# Patient Record
Sex: MALE | Race: WHITE | NOT HISPANIC OR LATINO | URBAN - METROPOLITAN AREA
[De-identification: names, ages, dates, MRNs, and addresses within clinical notes are randomized per-mention and may not be internally consistent; named-entity substitution may affect disease eponyms.]

---

## 2020-07-06 ENCOUNTER — WEB ENCOUNTER (OUTPATIENT)
Dept: URBAN - METROPOLITAN AREA CLINIC 35 | Facility: CLINIC | Age: 25
End: 2020-07-06

## 2020-07-07 ENCOUNTER — TELEPHONE ENCOUNTER (OUTPATIENT)
Dept: URBAN - METROPOLITAN AREA CLINIC 35 | Facility: CLINIC | Age: 25
End: 2020-07-07

## 2020-07-07 ENCOUNTER — OFFICE VISIT (OUTPATIENT)
Dept: URBAN - METROPOLITAN AREA CLINIC 35 | Facility: CLINIC | Age: 25
End: 2020-07-07

## 2020-07-07 VITALS — WEIGHT: 165 LBS | BODY MASS INDEX: 24.44 KG/M2 | HEIGHT: 69 IN

## 2020-07-07 PROBLEM — 274774002 ELEVATED LEVELS OF TRANSAMINASE & LACTIC ACID DEHYDROGENASE: Status: ACTIVE | Noted: 2020-07-07

## 2020-07-07 PROBLEM — 301715003: Status: ACTIVE | Noted: 2020-07-07

## 2020-07-07 RX ORDER — HYOSCYAMINE SULFATE 0.12 MG/1
1 TABLET AS NEEDED TABLET ORAL
Qty: 28 TABLET | Refills: 1 | OUTPATIENT
Start: 2020-07-07

## 2020-07-07 RX ORDER — LORATADINE 10 MG/1
1 TABLET TABLET ORAL ONCE A DAY
Qty: 30 | Status: ACTIVE | COMMUNITY

## 2020-07-07 RX ORDER — NAPROXEN SODIUM 220 MG/1
1 TABLET WITH FOOD OR MILK AS NEEDED TABLET ORAL
Status: ACTIVE | COMMUNITY

## 2020-07-07 NOTE — HPI-MIGRATED HPI
;   ;     Hepatitis C : Patient presents today via telephone with consent at the request of PCP Denis Choi for consultation about Hepatitis C.  Patient was first diagnosed via routine labs with PCP on Oct. 8, 2019 noting HCV RNA Quant Real Time PCR (High) 69647, HCV RNA Quant Real Time PCR (High) 4.75.    Patient denies previous treatment.    Patient currently denies jaundice, chills, RUQ pains, easy bruising.  Admit fatigue and intermittent episodes of dizziness over the past several months..     RISK FACTORS:   Admit Alcohol use with drinking 1 glass of a mixed alcoholic beverage on Fridays, Admit marijuana drug use years ago, travel outside the  to [facility] (5/2019), NSAID use with taking Aleve 2 QD 1-2 times a month for Migraine-type headaches, tattoos completed during incarceration and 2 in a  basement (not professionally done), in nursing home for 18 months.   Denies protein supplements, piercing's,  service, blood transfusion, family history of liver disease or cancer, personal exposure to liver diseases, rehab    Outside labs:PCP (10/8/2019) Hep A AB, Total (Non-Reactive), Hep B Surface Antibody QL (Non-Reactive), Hep B Surface Antigen (Non-Reactive), Hep B Core AB, Total (Non-Reactive), Hep C Antibody (Reactive Abnormal),  CMP: AST (High) 53, ALT (High) 85, Alk Phos (WNL) 84, Bilirubin Total (WNL) 0.9, Albumin (WNL) 4.7   CBC: WBC (WNL) 5.4, Hgb (WNL) 16.5, Hct (WNL) 47.0, Platelet (WNL) 316;   Abdominal Pain : Admit abdominal pain located in the mid abdomen.  Onset 1 day ago.  Described as a sharp stabbing then will have a burning sensation that would last for minutes to hours then gradually improved.  Symptoms begin upon waking up yesterday and today and persist intermittently throughout the day.  He took Aleve with some relief.He has no symtoms currently  Denies having imaging or ER visit for symptoms. ;

## 2020-08-26 ENCOUNTER — TELEPHONE ENCOUNTER (OUTPATIENT)
Dept: URBAN - METROPOLITAN AREA CLINIC 35 | Facility: CLINIC | Age: 25
End: 2020-08-26

## 2020-08-27 ENCOUNTER — OFFICE VISIT (OUTPATIENT)
Dept: URBAN - METROPOLITAN AREA CLINIC 33 | Facility: CLINIC | Age: 25
End: 2020-08-27

## 2020-08-27 RX ORDER — NAPROXEN SODIUM 220 MG/1
1 TABLET WITH FOOD OR MILK AS NEEDED TABLET ORAL
Status: ACTIVE | COMMUNITY

## 2020-08-27 RX ORDER — LORATADINE 10 MG/1
1 TABLET TABLET ORAL ONCE A DAY
Qty: 30 | Status: ACTIVE | COMMUNITY

## 2020-08-27 RX ORDER — HYOSCYAMINE SULFATE 0.12 MG/1
1 TABLET AS NEEDED TABLET ORAL
Qty: 28 TABLET | Refills: 1 | Status: ACTIVE | COMMUNITY
Start: 2020-07-07

## 2020-08-27 NOTE — HPI-MIGRATED HPI
;   ;     Hepatitis C :  Patient presents today for follow up about Hepatitis C testing. Patient denies/admits previous treatment.  Patient currently ____  jaundice, chills, RUQ pains, dizziness, easy bruising, fatigue.   Alcohol, drugs, travel outside the [place], NSAIDs, protein supplements, tattoos, piercings,  service, blood transfusion, family history of liver disease or cancer, personal exposure to liver diseases, snf, rehab.  ?Start Tylenol Capsule, 325 MG, 1 capsule as needed, Orally, every 6 hrs. ?Start Hyoscyamine Sulfate Tablet, 0.125 MG, 1 tablet as needed, Orally, Four times a day.  ?Hep A and B vaccinations adviced       Last visit (07/07/2020) Patient presents today via telephone with consent at the request of PCP Denis Choi for consultation about Hepatitis C.  Patient was first diagnosed via routine labs with PCP on Oct. 8, 2019 noting HCV RNA Quant Real Time PCR (High) [id number], HCV RNA Quant Real Time PCR (High) 4.75.    Patient denies previous treatment.    Patient currently denies jaundice, chills, RUQ pains, easy bruising.  Admit fatigue and intermittent episodes of dizziness over the past several months..     RISK FACTORS:   Admit Alcohol use with drinking 1 glass of a mixed alcoholic beverage on Fridays, Admit marijuana drug use years ago, travel outside the  to [facility] (5/2019), NSAID use with taking Aleve 2 QD 1-2 times a month for Migraine-type headaches, tattoos completed during incarceration and 2 in a  basement (not professionally done), in snf for 18 months.   Denies protein supplements, piercing's,  service, blood transfusion, family history of liver disease or cancer, personal exposure to liver diseases, rehab    Outside labs:PCP (10/8/2019) Hep A AB, Total (Non-Reactive), Hep B Surface Antibody QL (Non-Reactive), Hep B Surface Antigen (Non-Reactive), Hep B Core AB, Total (Non-Reactive), Hep C Antibody (Reactive Abnormal),  CMP: AST (High) 53, ALT (High) 85, Alk Phos (WNL) 84, Bilirubin Total (WNL) 0.9, Albumin (WNL) 4.7   CBC: WBC (WNL) 5.4, Hgb (WNL) 16.5, Hct (WNL) 47.0, Platelet (WNL) 316;   Abdominal Pain :        Last visit (07/07/2020) Admit abdominal pain located in the mid abdomen.  Onset 1 day ago.  Described as a sharp stabbing then will have a burning sensation that would last for minutes to hours then gradually improved.  Symptoms begin upon waking up yesterday and today and persist intermittently throughout the day.  He took Aleve with some relief.He has no symtoms currently  Denies having imaging or ER visit for symptoms. ;

## 2020-11-03 ENCOUNTER — TELEPHONE ENCOUNTER (OUTPATIENT)
Dept: URBAN - METROPOLITAN AREA CLINIC 35 | Facility: CLINIC | Age: 25
End: 2020-11-03

## 2020-11-23 ENCOUNTER — TELEPHONE ENCOUNTER (OUTPATIENT)
Dept: URBAN - METROPOLITAN AREA CLINIC 35 | Facility: CLINIC | Age: 25
End: 2020-11-23

## 2020-11-24 ENCOUNTER — OFFICE VISIT (OUTPATIENT)
Dept: URBAN - METROPOLITAN AREA CLINIC 31 | Facility: CLINIC | Age: 25
End: 2020-11-24

## 2020-11-24 RX ORDER — HYOSCYAMINE SULFATE 0.12 MG/1
1 TABLET AS NEEDED TABLET ORAL
Qty: 28 TABLET | Refills: 1 | Status: ACTIVE | COMMUNITY
Start: 2020-07-07

## 2020-11-24 RX ORDER — NAPROXEN SODIUM 220 MG/1
1 TABLET WITH FOOD OR MILK AS NEEDED TABLET ORAL
Status: ACTIVE | COMMUNITY

## 2020-11-24 RX ORDER — LORATADINE 10 MG/1
1 TABLET TABLET ORAL ONCE A DAY
Qty: 30 | Status: ACTIVE | COMMUNITY

## 2020-11-24 NOTE — HPI-MIGRATED HPI
;   ;     Hepatitis C :  Patient presents today for follow up about Hepatitis C testing. Patient denies/admits previous treatment.  Patient currently ____  jaundice, chills, RUQ pains, dizziness, easy bruising, fatigue.   ? Avoid NSAIDs  Alcohol, drugs, travel outside the [place], NSAIDs, protein supplements, tattoos, piercings,  service, blood transfusion, family history of liver disease or cancer, personal exposure to liver diseases, correction, rehab.  ?Start Tylenol Capsule, 325 MG, 1 capsule as needed, Orally, every 6 hrs. ?Start Hyoscyamine Sulfate Tablet, 0.125 MG, 1 tablet as needed, Orally, Four times a day.  ?Hep A and B vaccinations adviced       Last visit (07/07/2020) Patient presents today via telephone with consent at the request of PCP Denis Choi for consultation about Hepatitis C.  Patient was first diagnosed via routine labs with PCP on Oct. 8, 2019 noting HCV RNA Quant Real Time PCR (High) [id number], HCV RNA Quant Real Time PCR (High) 4.75.    Patient denies previous treatment.    Patient currently denies jaundice, chills, RUQ pains, easy bruising.  Admit fatigue and intermittent episodes of dizziness over the past several months..     RISK FACTORS:   Admit Alcohol use with drinking 1 glass of a mixed alcoholic beverage on Fridays, Admit marijuana drug use years ago, travel outside the  to [facility]"" (5/2019), NSAID use with taking Aleve 2 QD 1-2 times a month for Migraine-type headaches, tattoos completed during incarceration and 2 in a  basement (not professionally done), in correction for 18 months.   Denies protein supplements, piercing's,  service, blood transfusion, family history of liver disease or cancer, personal exposure to liver diseases, rehab    Outside labs:PCP (10/8/2019) Hep A AB, Total (Non-Reactive), Hep B Surface Antibody QL (Non-Reactive), Hep B Surface Antigen (Non-Reactive), Hep B Core AB, Total (Non-Reactive), Hep C Antibody (Reactive Abnormal),  CMP: AST (High) 53, ALT (High) 85, Alk Phos (WNL) 84, Bilirubin Total (WNL) 0.9, Albumin (WNL) 4.7   CBC: WBC (WNL) 5.4, Hgb (WNL) 16.5, Hct (WNL) 47.0, Platelet (WNL) 316;   Abdominal Pain : Patient admits/denies continued episodes of abdominal pain since his last visit.   Last visit (07/07/2020) Admit abdominal pain located in the mid abdomen.  Onset 1 day ago.  Described as a sharp stabbing then will have a burning sensation that would last for minutes to hours then gradually improved.  Symptoms begin upon waking up yesterday and today and persist intermittently throughout the day.  He took Aleve with some relief.He has no symtoms currently  Denies having imaging or ER visit for symptoms. ;

## 2020-12-09 LAB
ABSOLUTE BASOPHILS: 51
ABSOLUTE EOSINOPHILS: 9
ABSOLUTE LYMPHOCYTES: 1831
ABSOLUTE MONOCYTES: 221
ABSOLUTE NEUTROPHILS: 2489
ALBUMIN/GLOBULIN RATIO: 1.7
ALBUMIN: 4.5
ALKALINE PHOSPHATASE: 70
ALT: 77
AST: 47
BASOPHILS: 1.1
BILIRUBIN, TOTAL: 0.9
BUN/CREATININE RATIO: (no result)
CALCIUM: 9.5
CARBON DIOXIDE: 27
CHLORIDE: 104
CREATININE: 0.86
EGFR AFRICAN AMERICAN: 140
EGFR NON-AFR. AMERICAN: 121
EOSINOPHILS: 0.2
GLOBULIN: 2.6
GLUCOSE: 121
HCV RNA, QUANTITATIVE: (no result)
HCV RNA, QUANTITATIVE: 6.15
HEMATOCRIT: 44.3
HEMOGLOBIN: 15.4
HEPATITIS C VIRAL RNA GENOTYPE, LIPA(R): 3
LIPASE: 11
LYMPHOCYTES: 39.8
MCH: 31.6
MCHC: 34.8
MCV: 91
MONOCYTES: 4.8
MPV: 10.6
NEUTROPHILS: 54.1
PLATELET COUNT: 259
POTASSIUM: 3.7
PROTEIN, TOTAL: 7.1
RDW: 11.9
RED BLOOD CELL COUNT: 4.87
SODIUM: 140
UREA NITROGEN (BUN): 8
WHITE BLOOD CELL COUNT: 4.6

## 2021-01-18 ENCOUNTER — OFFICE VISIT (OUTPATIENT)
Dept: URBAN - METROPOLITAN AREA CLINIC 33 | Facility: CLINIC | Age: 26
End: 2021-01-18

## 2021-01-18 VITALS
SYSTOLIC BLOOD PRESSURE: 132 MMHG | BODY MASS INDEX: 27.55 KG/M2 | OXYGEN SATURATION: 98 % | WEIGHT: 186 LBS | DIASTOLIC BLOOD PRESSURE: 86 MMHG | HEART RATE: 85 BPM | HEIGHT: 69 IN

## 2021-01-18 PROBLEM — 443503005: Status: ACTIVE | Noted: 2020-07-07

## 2021-01-18 RX ORDER — NAPROXEN SODIUM 220 MG/1
1 TABLET WITH FOOD OR MILK AS NEEDED TABLET ORAL
Status: ACTIVE | COMMUNITY

## 2021-01-18 RX ORDER — GLECAPREVIR AND PIBRENTASVIR 40; 100 MG/1; MG/1
3 TABLETS TABLET, FILM COATED ORAL ONCE A DAY
Qty: 168 TABLET | Refills: 0 | OUTPATIENT
Start: 2021-01-18

## 2021-01-18 RX ORDER — LORATADINE 10 MG/1
1 TABLET TABLET ORAL ONCE A DAY
Qty: 30 | Status: ACTIVE | COMMUNITY

## 2021-01-18 RX ORDER — HYOSCYAMINE SULFATE 0.12 MG/1
1 TABLET AS NEEDED TABLET ORAL
Qty: 28 TABLET | Refills: 1 | Status: DISCONTINUED | COMMUNITY
Start: 2020-07-07

## 2021-01-18 NOTE — HPI-MIGRATED HPI
;   ;   ;   ;     Bloating/Gas : Admits bloating and gas. Symptoms are described as abdominal distension and flatulence. He notes onset of symptoms occurred 12/2020, with episodes occurring once a week. He denies any alleviating or aggravating factors. He denies having tried any medications for relief of symptoms. ;   Hepatitis C : Patient presents today for follow up for Hepatitis C testing. Patient denies previous treatment. He denies having Hep A and B vaccinations since his last office visit.   He currently denies jaundice, chills, RUQ pains, dizziness, or easy bruising. He admits daily fatigue.   He continues to avoid NSAID use.    Risk Factors:  He admits 1 glass of whiskey every 2 weeks.  Denies drugs, travel outside the [place], NSAID's, protein supplements, tattoos, piercing's,  service, blood transfusion, family history of liver disease or cancer, personal exposure to liver diseases, half-way, rehab.        Last visit (07/07/2020) Patient presents today via telephone with consent at the request of PCP Denis Choi for consultation about Hepatitis C.  Patient was first diagnosed via routine labs with PCP on Oct. 8, 2019 noting HCV RNA Quant Real Time PCR (High) [id number], HCV RNA Quant Real Time PCR (High) 4.75.    Patient denies previous treatment.    Patient currently denies jaundice, chills, RUQ pains, easy bruising.  Admit fatigue and intermittent episodes of dizziness over the past several months..     RISK FACTORS:   Admit Alcohol use with drinking 1 glass of a mixed alcoholic beverage on Fridays, Admit marijuana drug use years ago, travel outside the  to [facility] (5/2019), NSAID use with taking Aleve 2 QD 1-2 times a month for Migraine-type headaches, tattoos completed during incarceration and 2 in a  basement (not professionally done), in half-way for 18 months.   Denies protein supplements, piercing's,  service, blood transfusion, family history of liver disease or cancer, personal exposure to liver diseases, rehab    Outside labs:PCP (10/8/2019) Hep A AB, Total (Non-Reactive), Hep B Surface Antibody QL (Non-Reactive), Hep B Surface Antigen (Non-Reactive), Hep B Core AB, Total (Non-Reactive), Hep C Antibody (Reactive Abnormal),  CMP: AST (High) 53, ALT (High) 85, Alk Phos (WNL) 84, Bilirubin Total (WNL) 0.9, Albumin (WNL) 4.7   CBC: WBC (WNL) 5.4, Hgb (WNL) 16.5, Hct (WNL) 47.0, Platelet (WNL) 316;   Change in Bowel habits : Patient reports a change in bowel habits since his last office visit. He admits onset of symptoms occurred over 5 years ago. Admits 1 bowel movement every 2 days, with strain. Stools vary in consistency from separate hard lumps, to normal and formed.   He denies any mucus, melena. He admits rectal bleeding, enough to stain the water bowl. The blood is bright red in color.   He reports his normal bowel habits were 1 bowel movement per day, without strain.  He denies any alleviating or aggravating factors. He denies having tried any medications for relief of symptoms. ;   Abdominal Pain : Patient denies continued episodes of abdominal pain since his last visit. He denies use of Tylenol Capsule, 325 MG, 1 capsule PRN, every 6 hrs.  He also denies starting Hyoscyamine Sulfate Tablet, 0.125 MG, 1 tablet PRN.        Last visit (07/07/2020)  Admit abdominal pain located in the mid abdomen.  Onset 1 day ago.  Described as a sharp stabbing then will have a burning sensation that would last for minutes to hours then gradually improved.  Symptoms begin upon waking up yesterday and today and persist intermittently throughout the day.  He took Aleve with some relief. He has no symptoms currently  Denies having imaging or ER visit for symptoms. ;

## 2021-02-25 ENCOUNTER — TELEPHONE ENCOUNTER (OUTPATIENT)
Dept: URBAN - METROPOLITAN AREA CLINIC 35 | Facility: CLINIC | Age: 26
End: 2021-02-25

## 2021-02-25 RX ORDER — GLECAPREVIR AND PIBRENTASVIR 40; 100 MG/1; MG/1
3 TABLETS TABLET, FILM COATED ORAL ONCE A DAY
Qty: 168 TABLET | Refills: 0 | OUTPATIENT
Start: 2021-01-18

## 2021-02-25 RX ORDER — VELPATASVIR AND SOFOSBUVIR 100; 400 MG/1; MG/1
1 TABLET TABLET, FILM COATED ORAL ONCE A DAY
Qty: 90 TABLET | Refills: 0 | OUTPATIENT
Start: 2021-01-29

## 2021-03-04 ENCOUNTER — TELEPHONE ENCOUNTER (OUTPATIENT)
Dept: URBAN - METROPOLITAN AREA CLINIC 35 | Facility: CLINIC | Age: 26
End: 2021-03-04

## 2021-03-16 ENCOUNTER — DASHBOARD ENCOUNTERS (OUTPATIENT)
Age: 26
End: 2021-03-16

## 2021-03-16 PROBLEM — 128302006 CHRONIC HEPATITIS C: Status: ACTIVE | Noted: 2020-07-07

## 2021-03-19 ENCOUNTER — TELEPHONE ENCOUNTER (OUTPATIENT)
Dept: URBAN - METROPOLITAN AREA CLINIC 35 | Facility: CLINIC | Age: 26
End: 2021-03-19

## 2021-03-22 ENCOUNTER — OFFICE VISIT (OUTPATIENT)
Dept: URBAN - METROPOLITAN AREA CLINIC 33 | Facility: CLINIC | Age: 26
End: 2021-03-22

## 2021-03-22 RX ORDER — NAPROXEN SODIUM 220 MG/1
1 TABLET WITH FOOD OR MILK AS NEEDED TABLET ORAL
Status: ACTIVE | COMMUNITY

## 2021-03-22 RX ORDER — LORATADINE 10 MG/1
1 TABLET TABLET ORAL ONCE A DAY
Qty: 30 | Status: ACTIVE | COMMUNITY

## 2021-03-22 RX ORDER — VELPATASVIR AND SOFOSBUVIR 100; 400 MG/1; MG/1
1 TABLET TABLET, FILM COATED ORAL ONCE A DAY
Qty: 90 TABLET | Refills: 0 | Status: ACTIVE | COMMUNITY
Start: 2021-01-29

## 2021-03-22 RX ORDER — GLECAPREVIR AND PIBRENTASVIR 40; 100 MG/1; MG/1
3 TABLETS TABLET, FILM COATED ORAL ONCE A DAY
Qty: 168 TABLET | Refills: 0 | Status: ACTIVE | COMMUNITY
Start: 2021-01-18

## 2021-03-22 NOTE — HPI-MIGRATED HPI
;   ;   ;   ;     Bloating/Gas : Last visit (01/18/2021) Admits bloating and gas. Symptoms are described as abdominal distension and flatulence. He notes onset of symptoms occurred 12/2020, with episodes occurring once a week. He denies any alleviating or aggravating factors. He denies having tried any medications for relief of symptoms.;   Hepatitis C :    Last visit (01/18/2021) Patient presents today for follow up for Hepatitis C testing. Patient denies previous treatment. He denies having Hep A and B vaccinations since his last office visit.   He currently denies jaundice, chills, RUQ pains, dizziness, or easy bruising. He admits daily fatigue.   He continues to avoid NSAID use.    Risk Factors:  He admits 1 glass of whiskey every 2 weeks.  Denies drugs, travel outside the [place], NSAID's, protein supplements, tattoos, piercing's,  service, blood transfusion, family history of liver disease or cancer, personal exposure to liver diseases, nursing home, rehab.        Last visit (07/07/2020) Patient presents today via telephone with consent at the request of PCP Denis Choi for consultation about Hepatitis C.  Patient was first diagnosed via routine labs with PCP on Oct. 8, 2019 noting HCV RNA Quant Real Time PCR (High) [id number], HCV RNA Quant Real Time PCR (High) 4.75.    Patient denies previous treatment.    Patient currently denies jaundice, chills, RUQ pains, easy bruising.  Admit fatigue and intermittent episodes of dizziness over the past several months..     RISK FACTORS:   Admit Alcohol use with drinking 1 glass of a mixed alcoholic beverage on Fridays, Admit marijuana drug use years ago, travel outside the  to [facility] (5/2019), NSAID use with taking Aleve 2 QD 1-2 times a month for Migraine-type headaches, tattoos completed during incarceration and 2 in a  basement (not professionally done), in nursing home for 18 months.   Denies protein supplements, piercing's,  service, blood transfusion, family history of liver disease or cancer, personal exposure to liver diseases, rehab    Outside labs:PCP (10/8/2019) Hep A AB, Total (Non-Reactive), Hep B Surface Antibody QL (Non-Reactive), Hep B Surface Antigen (Non-Reactive), Hep B Core AB, Total (Non-Reactive), Hep C Antibody (Reactive Abnormal),  CMP: AST (High) 53, ALT (High) 85, Alk Phos (WNL) 84, Bilirubin Total (WNL) 0.9, Albumin (WNL) 4.7   CBC: WBC (WNL) 5.4, Hgb (WNL) 16.5, Hct (WNL) 47.0, Platelet (WNL) 316;   Change in Bowel habits : Last visit (01/18/2021) Patient reports a change in bowel habits since his last office visit. He admits onset of symptoms occurred over 5 years ago. Admits 1 bowel movement every 2 days, with strain. Stools vary in consistency from separate hard lumps, to normal and formed.   He denies any mucus, melena. He admits rectal bleeding, enough to stain the water bowl. The blood is bright red in color.   He reports his normal bowel habits were 1 bowel movement per day, without strain.  He denies any alleviating or aggravating factors. He denies having tried any medications for relief of symptoms.;   Abdominal Pain : Last visit (01/18/2021) Patient denies continued episodes of abdominal pain since his last visit. He denies use of Tylenol Capsule, 325 MG, 1 capsule PRN, every 6 hrs.  He also denies starting Hyoscyamine Sulfate Tablet, 0.125 MG, 1 tablet PRN.     Last visit (07/07/2020)  Admit abdominal pain located in the mid abdomen.  Onset 1 day ago.  Described as a sharp stabbing then will have a burning sensation that would last for minutes to hours then gradually improved.  Symptoms begin upon waking up yesterday and today and persist intermittently throughout the day.  He took Aleve with some relief. He has no symptoms currently  Denies having imaging or ER visit for symptoms.;

## 2021-09-09 ENCOUNTER — TELEPHONE ENCOUNTER (OUTPATIENT)
Dept: URBAN - METROPOLITAN AREA CLINIC 35 | Facility: CLINIC | Age: 26
End: 2021-09-09

## 2021-09-09 RX ORDER — GLECAPREVIR AND PIBRENTASVIR 40; 100 MG/1; MG/1
3 TABLETS TABLET, FILM COATED ORAL ONCE A DAY
Qty: 168 TABLET | Refills: 0 | OUTPATIENT
Start: 2021-01-18

## 2021-11-10 ENCOUNTER — TELEPHONE ENCOUNTER (OUTPATIENT)
Dept: URBAN - METROPOLITAN AREA CLINIC 35 | Facility: CLINIC | Age: 26
End: 2021-11-10